# Patient Record
Sex: FEMALE | Race: OTHER | HISPANIC OR LATINO
[De-identification: names, ages, dates, MRNs, and addresses within clinical notes are randomized per-mention and may not be internally consistent; named-entity substitution may affect disease eponyms.]

---

## 2022-04-28 ENCOUNTER — APPOINTMENT (OUTPATIENT)
Dept: SURGERY | Facility: CLINIC | Age: 42
End: 2022-04-28
Payer: MEDICAID

## 2022-04-28 ENCOUNTER — TRANSCRIPTION ENCOUNTER (OUTPATIENT)
Age: 42
End: 2022-04-28

## 2022-04-28 VITALS
HEIGHT: 62 IN | HEART RATE: 54 BPM | DIASTOLIC BLOOD PRESSURE: 71 MMHG | WEIGHT: 155 LBS | SYSTOLIC BLOOD PRESSURE: 121 MMHG | BODY MASS INDEX: 28.52 KG/M2

## 2022-04-28 DIAGNOSIS — Z78.9 OTHER SPECIFIED HEALTH STATUS: ICD-10-CM

## 2022-04-28 DIAGNOSIS — Z82.49 FAMILY HISTORY OF ISCHEMIC HEART DISEASE AND OTHER DISEASES OF THE CIRCULATORY SYSTEM: ICD-10-CM

## 2022-04-28 PROBLEM — Z00.00 ENCOUNTER FOR PREVENTIVE HEALTH EXAMINATION: Status: ACTIVE | Noted: 2022-04-28

## 2022-04-28 PROCEDURE — 99203 OFFICE O/P NEW LOW 30 MIN: CPT

## 2022-04-28 NOTE — PHYSICAL EXAM
[Alert] : alert [Oriented to Person] : oriented to person [Oriented to Place] : oriented to place [Oriented to Time] : oriented to time [Calm] : calm [de-identified] : She  is alert, well-groomed, and in NAD\par   [de-identified] : anicteric.  Nasal mucosa pink, septum midline. Oral mucosa pink.  Tongue midline, Pharynx without exudates.\par   [de-identified] : Neck supple. Trachea midline. Thyroid isthmus barely palpable, lobes not felt.\par   [de-identified] : left back mass is  mobile, Firm,   Smooth, non-tender,   Well defined.  deep. No palpable lymph nodes.   Mass size -  8 cm x  4  cm.

## 2022-04-28 NOTE — HISTORY OF PRESENT ILLNESS
[de-identified] : This is a 42 year  old patient who was referred by Dr. Adriana Kim with the chief complaint of having left back mass.  She  is not sure how long she has it fore. she noticed it after she lost 10 lbs.   She denies any trauma to the area.   She denies any fever or  night sweats. Appetite is good and weight is stable.  She states that recently the mass started to  get    more symptomatic. She wants to know if it could  be surgically  removed.\par \par

## 2022-04-28 NOTE — CONSULT LETTER
[Dear  ___] : Dear  [unfilled], [Consult Letter:] : I had the pleasure of evaluating your patient, [unfilled]. [Please see my note below.] : Please see my note below. [Consult Closing:] : Thank you very much for allowing me to participate in the care of this patient.  If you have any questions, please do not hesitate to contact me. [Sincerely,] : Sincerely, [FreeTextEntry3] : Anish Grimes MD, FACS

## 2022-05-19 ENCOUNTER — OUTPATIENT (OUTPATIENT)
Dept: OUTPATIENT SERVICES | Facility: HOSPITAL | Age: 42
LOS: 1 days | End: 2022-05-19
Payer: MEDICAID

## 2022-05-19 VITALS
RESPIRATION RATE: 18 BRPM | HEIGHT: 62 IN | WEIGHT: 154.98 LBS | SYSTOLIC BLOOD PRESSURE: 109 MMHG | OXYGEN SATURATION: 98 % | DIASTOLIC BLOOD PRESSURE: 73 MMHG | HEART RATE: 71 BPM | TEMPERATURE: 98 F

## 2022-05-19 DIAGNOSIS — Z01.818 ENCOUNTER FOR OTHER PREPROCEDURAL EXAMINATION: ICD-10-CM

## 2022-05-19 DIAGNOSIS — R22.2 LOCALIZED SWELLING, MASS AND LUMP, TRUNK: ICD-10-CM

## 2022-05-19 DIAGNOSIS — M79.89 OTHER SPECIFIED SOFT TISSUE DISORDERS: ICD-10-CM

## 2022-05-19 DIAGNOSIS — M25.561 PAIN IN RIGHT KNEE: ICD-10-CM

## 2022-05-19 DIAGNOSIS — Z98.890 OTHER SPECIFIED POSTPROCEDURAL STATES: Chronic | ICD-10-CM

## 2022-05-19 PROCEDURE — G0463: CPT

## 2022-05-19 NOTE — H&P PST ADULT - HISTORY OF PRESENT ILLNESS
This is a 42 yr old female with no PMH, PSH of right knee arthroscopy on 1/13/2022, right knee pain who presents with c/o left upper back mass for that has been enlarging and causing discomfort with pressure.  Pt is scheduled for excision of left back mass on 5/25/2022.

## 2022-05-19 NOTE — H&P PST ADULT - PROBLEM SELECTOR PLAN 1
Pt is scheduled for excision of left back mass on 5/25/2022.  Pt optimized for surgery by PCP.  Preoperative instructions discussed with pt and given to pt.   Instructed pt that she will need someone to escort her home after surgery, to notify security when she arrives in the lobby of the hospital that she is here for surgery, not to eat or drink anything after midnight the night before the surgery, to avoid NSAIDs such as Ibuprofen, Motrin, Aleve, Advil, Naproxen before surgery, to take Tylenol if needed for pain, to report if she has been exposed to any one with any contagious diseases including Covid-19 or if she is exhibiting any symptoms of COVID-19, to keep appointment for COVID-19  test 3 days before surgery. Instructed about use of Chlorhexidine 4% soap for 3 days before surgery including the morning of the surgery to prevent infection. Verbalized understanding of instructions given.   DARREN stop bang score 1. Pt denies history of DARREN, never did sleep study, is at low risk for sleep apnea.

## 2022-05-19 NOTE — H&P PST ADULT - PROBLEM SELECTOR PLAN 2
H/o right knee arthroscopy on 1/13/2022. C/o pain in right knee, wears knee brace.  Pt instructed to avoid NSAIDs 1 week before surgery.    Advised to take Tylenol as needed for pain. Stated understanding of instructions given.

## 2022-05-19 NOTE — H&P PST ADULT - ASSESSMENT
This is a 42 yr old female with no PMH, PSH of right knee arthroscopy on 1/13/2022, right knee pain, who presents with left back mass. Pt is scheduled for excision of left back mass on 5/25/2022.  DARREN stop bang score 1. Pt denies history of DARREN, never did sleep study, is at low risk for sleep apnea.

## 2022-05-19 NOTE — H&P PST ADULT - FALL HARM RISK - UNIVERSAL INTERVENTIONS
Bed in lowest position, wheels locked, appropriate side rails in place/Call bell, personal items and telephone in reach/Instruct patient to call for assistance before getting out of bed or chair/Non-slip footwear when patient is out of bed/[place] to call system/Physically safe environment - no spills, clutter or unnecessary equipment/Purposeful Proactive Rounding/Room/bathroom lighting operational, light cord in reach

## 2022-05-19 NOTE — H&P PST ADULT - NSICDXPASTSURGICALHX_GEN_ALL_CORE_FT
PAST SURGICAL HISTORY:  H/O arthroscopy of right knee      PAST SURGICAL HISTORY:  H/O arthroscopy of right knee 1/13/2022

## 2022-05-19 NOTE — H&P PST ADULT - NSANTHTIREDRD_ENT_A_CORE
"Routing refill request to provider for review/approval because:  Provider fill for acute dosing.    Last Written Prescription Date:  8/12/21  Last Fill Quantity: 30,  # refills: 3   Last office visit provider:  11/24/21     Requested Prescriptions   Pending Prescriptions Disp Refills     ondansetron (ZOFRAN) 4 MG tablet [Pharmacy Med Name: Ondansetron HCl Oral Tablet 4 MG] 30 tablet 0     Sig: Take 1 tablet (4 mg) by mouth every 8 hours as needed for nausea        Antivertigo/Antiemetic Agents Passed - 12/2/2021  6:04 PM        Passed - Recent (12 mo) or future (30 days) visit within the authorizing provider's specialty     Patient has had an office visit with the authorizing provider or a provider within the authorizing providers department within the previous 12 mos or has a future within next 30 days. See \"Patient Info\" tab in inbasket, or \"Choose Columns\" in Meds & Orders section of the refill encounter.              Passed - Medication is active on med list        Passed - Patient is 18 years of age or older             Mateo Gilliland RN 12/05/21 7:53 AM  "
No

## 2022-05-24 ENCOUNTER — TRANSCRIPTION ENCOUNTER (OUTPATIENT)
Age: 42
End: 2022-05-24

## 2022-05-25 ENCOUNTER — TRANSCRIPTION ENCOUNTER (OUTPATIENT)
Age: 42
End: 2022-05-25

## 2022-05-25 ENCOUNTER — APPOINTMENT (OUTPATIENT)
Dept: SURGERY | Facility: HOSPITAL | Age: 42
End: 2022-05-25
Payer: MEDICAID

## 2022-05-25 ENCOUNTER — RESULT REVIEW (OUTPATIENT)
Age: 42
End: 2022-05-25

## 2022-05-25 ENCOUNTER — OUTPATIENT (OUTPATIENT)
Dept: OUTPATIENT SERVICES | Facility: HOSPITAL | Age: 42
LOS: 1 days | End: 2022-05-25
Payer: MEDICAID

## 2022-05-25 VITALS
OXYGEN SATURATION: 100 % | DIASTOLIC BLOOD PRESSURE: 76 MMHG | TEMPERATURE: 98 F | SYSTOLIC BLOOD PRESSURE: 116 MMHG | RESPIRATION RATE: 14 BRPM | HEART RATE: 52 BPM

## 2022-05-25 VITALS
WEIGHT: 154.98 LBS | RESPIRATION RATE: 17 BRPM | TEMPERATURE: 98 F | SYSTOLIC BLOOD PRESSURE: 124 MMHG | OXYGEN SATURATION: 100 % | HEIGHT: 62 IN | DIASTOLIC BLOOD PRESSURE: 77 MMHG | HEART RATE: 59 BPM

## 2022-05-25 DIAGNOSIS — M79.89 OTHER SPECIFIED SOFT TISSUE DISORDERS: ICD-10-CM

## 2022-05-25 DIAGNOSIS — Z98.890 OTHER SPECIFIED POSTPROCEDURAL STATES: Chronic | ICD-10-CM

## 2022-05-25 LAB — HCG UR QL: NEGATIVE — SIGNIFICANT CHANGE UP

## 2022-05-25 PROCEDURE — 81025 URINE PREGNANCY TEST: CPT

## 2022-05-25 PROCEDURE — 88305 TISSUE EXAM BY PATHOLOGIST: CPT

## 2022-05-25 PROCEDURE — 21933 EXC BACK TUM DEEP 5 CM/>: CPT

## 2022-05-25 PROCEDURE — 88305 TISSUE EXAM BY PATHOLOGIST: CPT | Mod: 26

## 2022-05-25 PROCEDURE — 21933 EXC BACK TUM DEEP 5 CM/>: CPT | Mod: AS,LT

## 2022-05-25 RX ORDER — FENTANYL CITRATE 50 UG/ML
25 INJECTION INTRAVENOUS
Refills: 0 | Status: DISCONTINUED | OUTPATIENT
Start: 2022-05-25 | End: 2022-05-25

## 2022-05-25 RX ORDER — SODIUM CHLORIDE 9 MG/ML
3 INJECTION INTRAMUSCULAR; INTRAVENOUS; SUBCUTANEOUS EVERY 8 HOURS
Refills: 0 | Status: DISCONTINUED | OUTPATIENT
Start: 2022-05-25 | End: 2022-06-08

## 2022-05-25 RX ORDER — FENTANYL CITRATE 50 UG/ML
50 INJECTION INTRAVENOUS
Refills: 0 | Status: DISCONTINUED | OUTPATIENT
Start: 2022-05-25 | End: 2022-05-25

## 2022-05-25 RX ORDER — ACETAMINOPHEN 500 MG
650 TABLET ORAL EVERY 6 HOURS
Refills: 0 | Status: DISCONTINUED | OUTPATIENT
Start: 2022-05-25 | End: 2022-06-08

## 2022-05-25 NOTE — ASU DISCHARGE PLAN (ADULT/PEDIATRIC) - ASU DC SPECIAL INSTRUCTIONSFT
Please follow-up with your surgeon in 1 week. Drink plenty of fluids and rest as needed. Call for any fever over 101, nausea, vomiting, severe pain, no passing of gas or bowel movement.     DIET   You may resume your regular diet as normal.     SURGICAL SITES   Remove outer dressing and keep white steri-strips in place allowing them to fall off on their own. You may shower 48 hours post-operatively but do not bathe or soak in the water for 1-2 weeks; pat dry. If you notice any signs of surgical site infection (ie. redness, swelling, pain, pus drainage), please seek medical care immediately.   ACTIVITY Do not lift anything heavier than 10 pounds for 2 weeks and avoid strenuous activity for 4-6 weeks.     PAIN CONTROL   You may take Motrin 600mg (with food) or Tylenol 650mg as needed for mild pain. Stagger one medication 3 hours after the other for maximum pain control. Maximum daily dose of Tylenol should not exceed 4grams/day. Please follow-up with your surgeon in 2 weeks. Drink plenty of fluids and rest as needed. Call for any fever over 101, nausea, vomiting, severe pain, no passing of gas or bowel movement.     DIET   You may resume your regular diet as normal.     SURGICAL SITES   Remove outer dressing and keep white steri-strips in place allowing them to fall off on their own. You may shower 48 hours post-operatively but do not bathe or soak in the water for 1-2 weeks; pat dry. If you notice any signs of surgical site infection (ie. redness, swelling, pain, pus drainage), please seek medical care immediately.   ACTIVITY Do not lift anything heavier than 10 pounds for 2 weeks and avoid strenuous activity for 4-6 weeks.     PAIN CONTROL   You may take Motrin 600mg (with food) or Tylenol 650mg as needed for mild pain. Stagger one medication 3 hours after the other for maximum pain control. Maximum daily dose of Tylenol should not exceed 4grams/day.

## 2022-05-25 NOTE — ASU DISCHARGE PLAN (ADULT/PEDIATRIC) - PROVIDER TOKENS
PROVIDER:[TOKEN:[[id number]],FOLLOWUP:[1 week]] PROVIDER:[TOKEN:[[id number]],FOLLOWUP:[2 weeks]]

## 2022-05-25 NOTE — ASU DISCHARGE PLAN (ADULT/PEDIATRIC) - CALL YOUR DOCTOR IF YOU HAVE ANY OF THE FOLLOWING:
Wound/Surgical Site with redness, or foul smelling discharge or pus Bleeding that does not stop/Pain not relieved by Medications/Fever greater than (need to indicate Fahrenheit or Celsius)/Wound/Surgical Site with redness, or foul smelling discharge or pus

## 2022-05-25 NOTE — ASU PATIENT PROFILE, ADULT - FALL HARM RISK - UNIVERSAL INTERVENTIONS
Bed in lowest position, wheels locked, appropriate side rails in place/Call bell, personal items and telephone in reach/Instruct patient to call for assistance before getting out of bed or chair/Non-slip footwear when patient is out of bed/[place] to call system/Physically safe environment - no spills, clutter or unnecessary equipment/Purposeful Proactive Rounding/Room/bathroom lighting operational, light cord in reach

## 2022-05-25 NOTE — ASU DISCHARGE PLAN (ADULT/PEDIATRIC) - NS MD DC FALL RISK RISK
For information on Fall & Injury Prevention, visit: [email or web address] OR  [email or web address] OR  https://www.cdc.gov/steadi/patient.html

## 2022-05-25 NOTE — ASU DISCHARGE PLAN (ADULT/PEDIATRIC) - CARE PROVIDER_API CALL
Anish Grimes)  Surgery  [address] [facility], [place] [address]  Phone: [phone]  Fax: [phone]  Follow Up Time: 1 week   Anish Grimes)  Surgery  [address] [facility], [place] [address]  Phone: [phone]  Fax: [phone]  Follow Up Time: 2 weeks

## 2022-05-26 PROBLEM — R22.2 LOCALIZED SWELLING, MASS AND LUMP, TRUNK: Chronic | Status: ACTIVE | Noted: 2022-05-19

## 2022-05-31 LAB — SURGICAL PATHOLOGY STUDY: SIGNIFICANT CHANGE UP

## 2022-06-02 PROBLEM — M79.89 SOFT TISSUE MASS: Status: ACTIVE | Noted: 2022-04-28

## 2022-06-02 NOTE — DATA REVIEWED
[FreeTextEntry1] : Kendal Accession Number : [id number]\par Patient:   RAMIRO JORGENSEN\par \par \par Accession:                             [id number]\par \par Collected Date/Time:                   5/25/2022 14:03 EDT\par Received Date/Time:                    5/26/2022 09:41 EDT\par \par Surgical Pathology Report - Auth (Verified)\par \par Specimen(s) Submitted\par 1  Back mass\par \par Final Diagnosis\par Soft tissue mass, left side, back; excision:\par - Mature adipose tissue consistent with lipoma\par \par Verified by: Barb Andersen M.D.\par (Electronic Signature)\par Reported on: 05/31/22 10:42 EDT, [address]\par Phone: [phone]   Fax: [phone]\par _________________________________________________________________\par \par Clinical Information\par Left back mass\par  ASSESSMENT/PLAN: 41yFemale s/p Laproscopic sleeve gastrectomy      Neurologic:  Pain Control: Morphine PCA, plan to wean to oral regimen as diet advanced as per bariatric protocol    Respiratory:  DANIEL with use of CPAP at night, attempt to wean to room air from NC during the day    Cardiovascular:  Hx HTN however no medication regimen, continue to monitor however remains normotensive post operatively     Gastrointestinal/Nutrition:  NPO, bariatric protocol to begin 1/29 at 0700 as per primary team    Genitourinary/Renal:  Passed TOV, freely voiding, electrolyte repletion PRN    Hematologic:  - No acute issues  - Hep SQ  - SCD     Infectious Disease:  - No acute issues, periop cefotetan     Endocrine:  Hx of DM:  - ISS , FS q6h     Lines: PIVs    Disposition:  SICU, downgrade if remains stable ASSESSMENT/PLAN: 41yFemale s/p Laproscopic sleeve gastrectomy,     Neurologic:  Pain Control: Morphine PCA,     Respiratory:  DANIEL with use of CPAP at night, attempt to wean to room air from NC during the day    Cardiovascular:  Hx HTN however no medication regimen, continue to monitor however remains normotensive post operatively     Gastrointestinal/Nutrition:  Diet: Mikael Clear diet     Genitourinary/Renal:  Passed TOV, freely voiding, electrolyte repletion PRN    Hematologic:  - No acute issues  - Hep SQ  - SCD     Infectious Disease:  - No acute issues, periop cefotetan     Endocrine:  Hx of DM:  - ISS , FS q6h     Lines: PIVs    Disposition:  SICU, downgrade if remains stable

## 2022-06-02 NOTE — HISTORY OF PRESENT ILLNESS
[de-identified] : Ms. JORGENSEN  is s/p excision left back mass on 05/25/2022. Patient's pathology results were  consistent with lipoma

## 2022-06-06 ENCOUNTER — APPOINTMENT (OUTPATIENT)
Dept: SURGERY | Facility: CLINIC | Age: 42
End: 2022-06-06
Payer: MEDICAID

## 2022-06-06 VITALS — TEMPERATURE: 97 F

## 2022-06-06 DIAGNOSIS — M79.89 OTHER SPECIFIED SOFT TISSUE DISORDERS: ICD-10-CM

## 2022-06-06 PROCEDURE — 99024 POSTOP FOLLOW-UP VISIT: CPT

## 2022-12-09 NOTE — ASU DISCHARGE PLAN (ADULT/PEDIATRIC) - ACCOMPANIED BY
Unna Boot Text: An Unna boot was placed to help immobilize the limb and facilitate more rapid healing. Family

## 2023-08-23 LAB — SARS-COV-2 N GENE NPH QL NAA+PROBE: NOT DETECTED

## (undated) DEVICE — FOR-ESU VALLEYLAB T7E14982DX: Type: DURABLE MEDICAL EQUIPMENT

## (undated) DEVICE — SUT POLYSORB 3-0 30" V-20 UNDYED

## (undated) DEVICE — GLV 7 PROTEXIS

## (undated) DEVICE — PACK MINOR NO DRAPE

## (undated) DEVICE — DRAPE LAPAROTOMY TRANSVERSE

## (undated) DEVICE — DRSG MASTISOL

## (undated) DEVICE — SUT POLYSORB 4-0 27" P-12 UNDYED

## (undated) DEVICE — DRSG TEGADERM 4X4.75"

## (undated) DEVICE — ELCTR GROUNDING PAD ADULT COVIDIEN

## (undated) DEVICE — WRAP COMPRESSION CALF MED

## (undated) DEVICE — GOWN XL

## (undated) DEVICE — NDL HYPO SAFE 25G X 1.5"

## (undated) DEVICE — BLANKET WARMER LOWER ADULT

## (undated) DEVICE — SOL IRR POUR NS 0.9% 1500ML

## (undated) DEVICE — DRAPE HALF SHEET 40X57"

## (undated) DEVICE — DRAPE LIGHT HANDLE COVER BLUE

## (undated) DEVICE — GLV 7.5 PROTEXIS

## (undated) DEVICE — SOL IRR POUR H2O 500ML

## (undated) DEVICE — DRSG 4X4